# Patient Record
Sex: MALE | Race: WHITE | NOT HISPANIC OR LATINO | Employment: FULL TIME | URBAN - METROPOLITAN AREA
[De-identification: names, ages, dates, MRNs, and addresses within clinical notes are randomized per-mention and may not be internally consistent; named-entity substitution may affect disease eponyms.]

---

## 2019-02-07 ENCOUNTER — HOSPITAL ENCOUNTER (OUTPATIENT)
Facility: MEDICAL CENTER | Age: 29
End: 2019-02-07
Attending: ORTHOPAEDIC SURGERY | Admitting: ORTHOPAEDIC SURGERY
Payer: COMMERCIAL

## 2019-02-07 ENCOUNTER — OCCUPATIONAL MEDICINE (OUTPATIENT)
Dept: URGENT CARE | Facility: CLINIC | Age: 29
End: 2019-02-07
Payer: COMMERCIAL

## 2019-02-07 VITALS
SYSTOLIC BLOOD PRESSURE: 126 MMHG | HEART RATE: 67 BPM | WEIGHT: 315 LBS | OXYGEN SATURATION: 97 % | BODY MASS INDEX: 40.43 KG/M2 | DIASTOLIC BLOOD PRESSURE: 74 MMHG | RESPIRATION RATE: 16 BRPM | TEMPERATURE: 97.3 F | HEIGHT: 74 IN

## 2019-02-07 DIAGNOSIS — Z02.1 PRE-EMPLOYMENT DRUG SCREENING: ICD-10-CM
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
## 2019-02-07 DIAGNOSIS — S52.572A OTHER CLOSED INTRA-ARTICULAR FRACTURE OF DISTAL END OF LEFT RADIUS, INITIAL ENCOUNTER: ICD-10-CM

## 2019-02-07 LAB
AMP AMPHETAMINE: NORMAL
BAR BARBITURATES: NORMAL
BZO BENZODIAZEPINES: NORMAL
COC COCAINE: NORMAL
INT CON NEG: NORMAL
INT CON POS: NORMAL
MDMA ECSTASY: NORMAL
MET METHAMPHETAMINES: NORMAL
MTD METHADONE: NORMAL
OPI OPIATES: POSITIVE
OXY OXYCODONE: NORMAL
PCP PHENCYCLIDINE: NORMAL
POC URINE DRUG SCREEN OCDRS: POSITIVE
THC: POSITIVE

## 2019-02-07 PROCEDURE — 80305 DRUG TEST PRSMV DIR OPT OBS: CPT | Performed by: PHYSICIAN ASSISTANT

## 2019-02-07 RX ORDER — HYDROCODONE BITARTRATE AND ACETAMINOPHEN 5; 325 MG/1; MG/1
1-2 TABLET ORAL EVERY 4 HOURS PRN
COMMUNITY

## 2019-02-07 ASSESSMENT — ENCOUNTER SYMPTOMS
MUSCULOSKELETAL NEGATIVE: 1
CHILLS: 0
DIARRHEA: 0
VOMITING: 0
SHORTNESS OF BREATH: 0
NAUSEA: 0
DIZZINESS: 0
ABDOMINAL PAIN: 0
COUGH: 0
FEVER: 0

## 2019-02-07 NOTE — NON-PROVIDER
Patient came stating that he had injured himself at work. We are contracted with [employer] and per the [facility], for post accidents we needed to perform a post accident instant 11 UDS. After performing the UDS and upon intake patient stated he was refusing the W/C. He told the PARS that he wasn't going to sign anything and refuse the W/C visit.

## 2019-02-07 NOTE — LETTER
"EMPLOYEE’S CLAIM FOR COMPENSATION/ REPORT OF INITIAL TREATMENT  FORM C-4    EMPLOYEE’S CLAIM - PROVIDE ALL INFORMATION REQUESTED   First Name  Rizwana Last Name  Ramona Birthdate                    1990                Sex  male Claim Number   Home Address  [address] Age  29 y.o. Height  1.88 m (6' 2\") Weight  (!) 145.2 kg (320 lb) [facility]     [place] Zip  [address] Telephone  [phone] (home)    Mailing Address  [address] [place] [facility] [place] Zip  [address] Primary Language Spoken  English    Insurer   Third Party   [employer]   Employee's Occupation (Job Title) When Injury or Occupational Disease Occurred      Employer's Name  [employer]  Telephone  [phone]    Employer Address  [address]  Zip  [address]    Date of Injury  2/6/2019               Hour of Injury  3:00 PM Date Employer Notified  2/6/2019 Last Day of Work after Injury or Occupational Disease  2/6/2019 Supervisor to Whom Injury Reported  Luiz Tan   Address or Location of Accident (if applicable)  [Headwall]   What were you doing at the time of accident? (if applicable)  Snowboarding    How did this injury or occupational disease occur? (Be specific an answer in detail. Use additional sheet if necessary)  Fell on wrist   If you believe that you have an occupational disease, when did you first have knowledge of the disability and it relationship to your employment?  n/a Witnesses to the Accident  Les Dotson      Nature of Injury or Occupational Disease  Fracture  Part(s) of Body Injured or Affected  Wrist (L) and Hand (L), N/A, N/A    I certify that the above is true and correct to the best of my knowledge and that I have provided this information in order to obtain the benefits of [place] " inclusive or [place] of [place]).  I hereby authorize any physician, chiropractor, surgeon, practitioner, or other person, any hospital, including [facility] or [facility] hospital, any medical service organization, any insurance company, or other institution or organization to release to each other, any medical or other information, including benefits paid or payable, pertinent to this injury or disease, except information relative to diagnosis, treatment and/or counseling for AIDS, psychological conditions, alcohol or controlled substances, for which I must give specific authorization.  A Photostat of this authorization shall be as valid as the original.     Date   Place   Employee’s Signature   THIS REPORT MUST BE COMPLETED AND MAILED WITHIN 3 WORKING DAYS OF TREATMENT   Place  [facility]  Name of Facility  [facility]   Date  2/7/2019 Diagnosis  (S52.572A) Other closed intra-articular fracture of distal end of left radius, initial encounter Is there evidence the injured employee was under the influence of alcohol and/or another controlled substance at the time of accident?   Hour  10:32 AM Description of Injury or Disease  The encounter diagnosis was Other closed intra-articular fracture of distal end of left radius, initial encounter. No   Treatment  Patient declines any treatment at today's visit due to lack of insurance. He would like to seek medical treatment in [place], where he has citizenship.   Have you advised the patient to remain off work five days or more? No   X-Ray Findings  Positive  Comments:left distal radius fracture with intra-articular involement   If Yes   From Date  To Date      From information given by the employee, together with medical evidence, can you directly connect this injury or occupational disease as job incurred?  No If No Full Duty  No Modified Duty  Yes   Is additional medical care by a physician indicated?  Yes If Modified Duty, Specify any Limitations  "/ Restrictions  Physical Restrictions     Reaching Above / Below Shoulders: 0 hrs/day   Repetitive Actions  Not To Exceed Weight Limits    Weight Limit (LB): (Comment: No lifting with left upper extremity)  Weight Limit (LB): (Comment: No lifting with left upper extremity)   Do you know of any previous injury or disease contributing to this condition or occupational disease?                            No   Date  2/7/2019 Print Doctor’s Name Ines Melgar P.A.-C. I certify the employer’s copy of  this form was mailed on:   Address  [address] Insurer’s Use Only     [place]  [address]    Provider’s Tax ID Number  [id number] Telephone  Dept: [phone]        e-INES Pritchett P.A.-C.   e-Signature: Dr. Grayson Nguyen, Medical Director Degree  SUNIL        ORIGINAL-TREATING PHYSICIAN OR CHIROPRACTOR    PAGE 2-INSURER/TPA    PAGE 3-EMPLOYER    PAGE 4-EMPLOYEE             Form C-4 (rev10/07)              BRIEF DESCRIPTION OF RIGHTS AND BENEFITS  (Pursuant to [place]RS 616C.050)    Notice of Injury or Occupational Disease (Incident Report Form C-1): If an injury or occupational disease (OD) arises out of and in the  course of employment, you must provide written notice to your employer as soon as practicable, but no later than 7 days after the accident or  OD. Your employer shall maintain a sufficient supply of the required forms.    Claim for Compensation (Form C-4): If medical treatment is sought, the form C-4 is available at the place of initial treatment. A completed  \"Claim for Compensation\" (Form C-4) must be filed within 90 days after an accident or OD. The treating physician or chiropractor must,  within 3 working days after treatment, complete and mail to the employer, the employer's insurer and third-party , the Claim for  Compensation.    Medical Treatment: If you require medical treatment for your on-the-job injury or OD, you may be required to select a physician " or  chiropractor from a list provided by your workers’ compensation insurer, if it has contracted with an Organization for Managed Care (MCO) or  Preferred Provider Organization (PPO) or providers of health care. If your employer has not entered into a contract with an MCO or PPO, you  may select a physician or chiropractor from the Panel of Physicians and Chiropractors. Any medical costs related to your industrial injury or  OD will be paid by your insurer.    Temporary Total Disability (TTD): If your doctor has certified that you are unable to work for a period of at least 5 consecutive days, or 5  cumulative days in a 20-day period, or places restrictions on you that your employer does not accommodate, you may be entitled to TTD  compensation.    Temporary Partial Disability (TPD): If the wage you receive upon reemployment is less than the compensation for TTD to which you are  entitled, the insurer may be required to pay you TPD compensation to make up the difference. TPD can only be paid for a maximum of 24  months.    Permanent Partial Disability (PPD): When your medical condition is stable and there is an indication of a PPD as a result of your injury or  OD, within 30 days, your insurer must arrange for an evaluation by a rating physician or chiropractor to determine the degree of your PPD. The  amount of your PPD award depends on the date of injury, the results of the PPD evaluation and your age and wage.    Permanent Total Disability (PTD): If you are medically certified by a treating physician or chiropractor as permanently and totally disabled  and have been granted a PTD status by your insurer, you are entitled to receive monthly benefits not to exceed 66 2/3% of your average  monthly wage. The amount of your PTD payments is subject to reduction if you previously received a PPD award.    Vocational Rehabilitation Services: You may be eligible for vocational rehabilitation services if you are unable to  return to the job due to a  permanent physical impairment or permanent restrictions as a result of your injury or occupational disease.    Transportation and Per Amy Reimbursement: You may be eligible for travel expenses and per amy associated with medical treatment.    Reopening: You may be able to reopen your claim if your condition worsens after claim closure.    Appeal Process: If you disagree with a written determination issued by the insurer or the insurer does not respond to your request, you may  appeal to the Department of Administration, , by following the instructions contained in your determination letter. You must  appeal the determination within 70 days from the date of the determination letter at [address], [address], [place], [place]  [address], or [address], [address], [place], [place] [address]. If you disagree with the  decision, you may appeal to the  Department of Administration, . You must file your appeal within 30 days from the date of the  decision  letter at [address], [address], [place], [place] [address], or [address] [facility] [address], [place] [address]. If you  disagree with a decision of an , you may file a petition for judicial review with the District Court. You must do so within 30  days of the Appeal Officer’s decision. You may be represented by an  at your own expense or you may contact the [facility] for possible  representation.    [place]  for Injured Workers (NAIW): If you disagree with a  decision, you may request that NAIW represent you  without charge at an  Hearing. For information regarding denial of benefits, you may contact the [facility] at: [address], [address], [place] [address], [phone], or [address] [facility] [address], [place] [address], [phone]    To File a Complaint with the  Division: If you wish to file a complaint with the  of the Division of Industrial Relations (DIR),  please contact the Workers’ Compensation Section, [address], [address], [place], [place] [address], telephone [phone]) [phone], or  [address], [address], [place], [place] [address], telephone [phone].    For assistance with Workers’ Compensation Issues: you may contact the Office of the Governor Consumer Health Assistance, [address] [place] [address], [address], [place], [place] [address], Toll Free 1-318.709.2675, Web site: [email or web address].us, E-mail  [email or web address].                                                                                                                                                                                                                                   __________________________________________________________________                                                                   _________________                Employee Name / Signature                                                                                                                                                       Date                                                                                                                                                                                                     D-2 (rev. 10/07)

## 2019-02-07 NOTE — LETTER
"   [facility]  [address] [facility] [address] [address] [address]  Phone:  [phone] - Fax:  [phone]   [facility] Progress Report and Disability Certification  Date of Service: 2/7/2019   No Show:  No  Date / Time of Next Visit: 2/14/2019 - Patient refused service and left without making follow-up   Claim Information   Patient Name: Rizwana Greene  Claim Number:     Employer: [employer]  Date of Injury: 2/6/2019     Insurer / TPA: Esme  ID / SSN:     Occupation:   Diagnosis: The encounter diagnosis was Other closed intra-articular fracture of distal end of left radius, initial encounter.    Medical Information   Related to Industrial Injury? No    Subjective Complaints:  DOI: 2/6/19. Patient presents to urgent care reporting a left wrist fracture that occurred yesterday while snowboarding. He states he was on a break at the time and wasn't performing any work duties. He was seen in a clinic at [place] and had x-rays performed, which showed \"distal radius fracture with intra-articular involvement\". He was placed in a sugar tong splint and slings and referred to [facility] urgent care for further evaluation and casting. He reports slight tingling in his left pinky finger. No prior left wrist/hand injuries.    Objective Findings: Musculoskeletal:   Left arm in sugar tong splint ant sling. Distally n/v intact.     Pre-Existing Condition(s): None   Assessment:   Initial Visit    Status: Additional Care Required  Permanent Disability:No    Plan:      Diagnostics: X-ray  Comments:Left distal radius fracture with intra-articular involvement.     Comments:       Disability Information   Status: Released to Restricted Duty    From:  2/7/2019  Through: 2/14/2019 Restrictions are:     Physical Restrictions   Sitting:    Standing:    Stooping:    Bending:      Squatting:    Walking:    Climbing:    Pushing:      Pulling:    Other:    Reaching Above Shoulder " (L): 0 hrs/day Reaching Above Shoulder (R):       Reaching Below Shoulder (L):  0 hrs/day Reaching Below Shoulder (R):      Not to exceed Weight Limits   Carrying(hrs):   Weight Limit(lb):   Comments:No lifting with left upper extremity Lifting(hrs):   Weight  Limit(lb):   Comments:No lifting with left upper extremity   Comments: Patient declines any treatment at today's visit due to lack of insurance. He would like to seek medical treatment in [facility], where he has citizenship.     Repetitive Actions   Hands: i.e. Fine Manipulations from Grasping:     Feet: i.e. Operating Foot Controls:     Driving / Operate Machinery:     Physician Name: Ines Melgar P.A.-C. Physician Signature: INES Ayala P.A.-C. e-Signature: Dr. Grayson Nguyen, Medical Director   Clinic Name / Location: [address] [address] Clinic Phone Number: Dept: [phone]   Appointment Time: 9:45 Am Visit Start Time: 10:32 AM   Check-In Time:  9:44 Am Visit Discharge Time: 11:35 Am    Original-Treating Physician or Chiropractor    Page 2-Insurer/TPA    Page 3-Employer    Page 4-Employee

## 2019-02-07 NOTE — PROGRESS NOTES
"Subjective:      Rizwana Greene is a 29 y.o. male who presents with Wrist Injury (NEW WC DOI 2/6/2019 (L) wrist)      DOI: 2/6/19. Patient presents to urgent care reporting a left wrist fracture that occurred yesterday while snowboarding. He states he was on a break at the time and wasn't performing any work duties. He was seen in a clinic at [place] and had x-rays performed, which showed \"distal radius fracture with intra-articular involvement\". He was placed in a sugar tong splint and slings and referred to [facility] urgent care for further evaluation and casting. He reports slight tingling in his left pinky finger. No prior left wrist/hand injuries.       Wrist Injury    Pertinent negatives include no chest pain.       Review of Systems   Constitutional: Negative for chills and fever.   HENT: Negative for congestion.    Respiratory: Negative for cough and shortness of breath.    Cardiovascular: Negative for chest pain.   Gastrointestinal: Negative for abdominal pain, diarrhea, nausea and vomiting.   Genitourinary: Negative.    Musculoskeletal: Negative.         + left wrist pain   Neurological: Negative for dizziness.        Objective:     /74 (BP Location: Left arm, Patient Position: Sitting, BP Cuff Size: Large adult)   Pulse 67   Temp 36.3 °C (97.3 °F) (Temporal)   Resp 16   Ht 1.88 m (6' 2\")   Wt (!) 145.2 kg (320 lb)   SpO2 97%   BMI 41.09 kg/m²      Physical Exam   Constitutional: He is oriented to person, place, and time. He appears well-developed and well-nourished.   HENT:   Head: Normocephalic and atraumatic.   Eyes: Pupils are equal, round, and reactive to light.   Neck: Normal range of motion.   Cardiovascular: Normal rate.    Pulmonary/Chest: Effort normal.   Musculoskeletal:   Left arm in sugar tong splint ant sling. Distally n/v intact.    Neurological: He is alert and oriented to person, place, and time.   Skin: Skin is warm and dry.   Psychiatric: He has a normal mood and affect. " His behavior is normal.   Nursing note and vitals reviewed.         PMH:  has no past medical history of Breast cancer.  MEDS:   Current Outpatient Prescriptions:   •  methylPREDNISolone (MEDROL) 2 MG Tab, Take 2 mg by mouth every day., Disp: , Rfl:   •  HYDROcodone-acetaminophen (NORCO) 5-325 MG Tab per tablet, Take 1-2 Tabs by mouth every four hours as needed., Disp: , Rfl:   ALLERGIES:   Allergies   Allergen Reactions   • Sulfa Drugs      SURGHX: No past surgical history on file.  SOCHX:    FH: family history is not on file.     Assessment/Plan:     1. Other closed intra-articular fracture of distal end of left radius, initial encounter    Patient declines any treatment at today's visit due to lack of insurance. He would like to seek medical treatment in [place], where he has citizenship.

## 2019-02-14 ENCOUNTER — NON-PROVIDER VISIT (OUTPATIENT)
Dept: OCCUPATIONAL MEDICINE | Facility: CLINIC | Age: 29
End: 2019-02-14
Payer: COMMERCIAL

## 2019-02-14 DIAGNOSIS — Z02.83 ENCOUNTER FOR DRUG SCREENING: ICD-10-CM

## 2019-02-14 PROCEDURE — 8911 PR MRO FEE: Performed by: INTERNAL MEDICINE
